# Patient Record
(demographics unavailable — no encounter records)

---
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
## 2025-01-30 NOTE — HISTORY OF PRESENT ILLNESS
[de-identified] : Patient is s/p right knee arthroscopy.  Doing well. Pain controlled.  NAD Right knee: Incisions healed, c/d/i Mild swelling ROM 0-120 Neg Instability NVI Compartments soft and NT  s/p right knee arthroscopy Went over surgery in detail Will start PT Continue pain control fu in 6 weeks All questions answered